# Patient Record
Sex: FEMALE | Race: WHITE
[De-identification: names, ages, dates, MRNs, and addresses within clinical notes are randomized per-mention and may not be internally consistent; named-entity substitution may affect disease eponyms.]

---

## 2018-10-11 ENCOUNTER — HOSPITAL ENCOUNTER (INPATIENT)
Dept: HOSPITAL 36 - GERO | Age: 68
LOS: 11 days | Discharge: NURSING, REHAB OR LONG-TERM CARE | DRG: 885 | End: 2018-10-22
Attending: PSYCHIATRY & NEUROLOGY | Admitting: PSYCHIATRY & NEUROLOGY
Payer: MEDICARE

## 2018-10-11 VITALS — SYSTOLIC BLOOD PRESSURE: 161 MMHG | DIASTOLIC BLOOD PRESSURE: 69 MMHG

## 2018-10-11 DIAGNOSIS — I10: ICD-10-CM

## 2018-10-11 DIAGNOSIS — M54.9: ICD-10-CM

## 2018-10-11 DIAGNOSIS — R27.0: ICD-10-CM

## 2018-10-11 DIAGNOSIS — G89.29: ICD-10-CM

## 2018-10-11 DIAGNOSIS — F20.0: Primary | ICD-10-CM

## 2018-10-11 DIAGNOSIS — F17.210: ICD-10-CM

## 2018-10-11 DIAGNOSIS — J44.9: ICD-10-CM

## 2018-10-11 DIAGNOSIS — M19.90: ICD-10-CM

## 2018-10-11 DIAGNOSIS — F03.90: ICD-10-CM

## 2018-10-11 DIAGNOSIS — F29: ICD-10-CM

## 2018-10-11 PROCEDURE — G0410 GRP PSYCH PARTIAL HOSP 45-50: HCPCS

## 2018-10-12 NOTE — CONSULTATION
DATE OF CONSULTATION:  



INTERNAL MEDICINE CONSULT



REFERRING MD:  Dr. Stewart.



REASON FOR CONSULT:  Medical management.



HISTORY OF PRESENT ILLNESS:  The patient is a 68-year-old  female, who

apparently was brought in to [facility] by the police

department for inability to meet basic needs.  The patient apparently has

undefined psych diagnosis.  Per records, she has a history of hypertension and

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

ambulation.  She also states that she has chronic lower back issues secondary to

multiple accidents.  She was transferred to this hospital for Geropsych

supportive care.  She is awake and was able to answer some simple questions, but

overall she is a poor historian and somewhat hard of hearing.



PAST MEDICAL HISTORY:  As noted above, history of hypertension.



PAST SURGICAL HISTORY:  States that she has had multiple surgeries including

.



FAMILY HISTORY:  Likely noncontributory to this admission.



SOCIAL HISTORY:  Alcohol denies.  Tobacco, she does smoke on a daily basis,

unable to quantify it.  No illicit drug usage reported.



ALLERGIES:  CODEINE.



OUTPATIENT MEDICATIONS:  None listed.



REVIEW OF SYSTEMS:  A good review of systems was not able to be done given

patient's condition.

CONSTITUTIONAL:  She denies any fever, chills, and recent weight loss.

CARDIOVASCULAR:  No chest pain, palpitations.

PULMONARY:  No cough, phlegm production.

GASTROINTESTINAL:  Denies any abdominal pain.

GENITOURINARY:  No bladder habit changes.

NEUROLOGIC:  Denies any syncope or any headaches.

MUSCULOSKELETAL:  Unable to walk without a walker secondary to instability and

back issues.



PHYSICAL EXAMINATION:

VITAL SIGNS:  Temperature 96.8, pulse 97, /69, respiratory rate 20, sats

92 on room air.

GENERAL:  She is a well-developed, thin female, not in acute distress, who

appears somewhat disheveled.  Oropharynx is moist and clear.

CARDIAC:  Regular rate with distant sounds.

LUNGS:  Diminished at the bases.  Clear to auscultation.

ABDOMEN:  Soft, supple, nontender, nondistended, normoactive bowel sounds.

EXTREMITIES:  Lower extremities, there is no pedal edema.

NEUROLOGIC:  She appears to be nonfocal.  Cranial nerves 2-12 are within normal

limits.



LABORATORY DATA:  Done on the  show a white count of 9.9, H and H of 14/44

with a platelet count of 343.



IMPRESSION:

1.  Grave disability with danger to self.

2.  Psychiatric disorder - nonspecific.

3.  History of essential hypertension.

4.  Ataxia.

5.  History of lower back pain.



PLAN:  The patient has been admitted to the Geropsych fuentes where she will be

monitored for her disability.  I will start the patient on clonidine p.r.n. for

SBP greater than 160 and will start her on lisinopril 10 mg q. day.  We will

continue to monitor BP and we will adjust meds as needed.  I will also ask for a

PT eval given her ataxia.





DD: 10/12/2018 10:52

DT: 10/12/2018 22:54

JOB# [id number]  [id number]

## 2018-10-12 NOTE — HISTORY & PHYSICAL
ADMIT DATE:  10/11/2018



HISTORY OF PRESENT ILLNESS:  The patient is a 68-year-old female with long

history of hypertension, COPD, dementia, psychosis, admitted to [facility] under Dr. Stewart's service.  The patient denies any chest

pain, shortness of breath, nausea, vomiting, fever or chills.



PAST MEDICAL HISTORY:  Significant for hypertension, COPD, degenerative joint

disease, dementia.



PAST SURGICAL HISTORY:  No recent surgery.



ALLERGIES:  None.



MEDICATIONS:  Follow admission reconciliation.



SOCIAL HISTORY:  Chronic smoker.  No alcohol or drugs.



FAMILY HISTORY:  Noncontributory.



REVIEW OF SYSTEMS:

RENAL SYSTEM:  No history of chronic renal disorder.

CARDIOVASCULAR SYSTEM:  No coronary artery disease.  She has history of

hypertension.

ENDOCRINE SYSTEM:  No diabetes or thyroid problem.

GASTROINTESTINAL SYSTEM:  No upper or lower GI bleed.

NEUROLOGICAL SYSTEM:  No seizure disorder.

MUSCULOSKELETAL:  No muscular dystrophy.

HEMATOLOGIC SYSTEM:  No bleeding tendencies.

RESPIRATORY SYSTEM:  She has history of COPD.

GENITOURINARY SYSTEM:  No dysuria or hematuria.



PHYSICAL EXAMINATION:

GENERAL:  She is awake, alert, confused.

VITAL SIGNS:  Temperature 97.4, heart rate 87, blood pressure 119/77.

HEENT:  Normocephalic.  Pupils reactive to light and accommodation.  Sclerae

clear.

NECK:  Supple.  Negative for lymphadenopathy, JVD or bruit.

CHEST:  Entry of air bilaterally diminished throughout.

HEART:  S1, S2 normal.

ABDOMEN:  Soft, bowel sounds positive.

EXTREMITIES:  No edema.

NEUROLOGICAL:  She is awake, alert, not coherent.  She has no focal motor or

sensory deficits.  Cranial nerves 2-12 are intact.



ASSESSMENT:

1.  Hypertension.

2.  Chronic obstructive pulmonary disease.

3.  Degenerative joint disease.

4.  Dementia.

5.  Psychosis.



PLAN:  The patient in the hospital under Dr. Stewart's service.  Medical

problems addressed during hospitalization  psychosis.  Medical problems

addressed at discharge COPD and hypertension.  The patient is medically stable

for activity.



Thank you Dr. Stewart for asking me to see your patient.





DD: 10/12/2018 21:03

DT: 10/12/2018 22:33

JOB# [id number]  [id number]

## 2018-10-13 NOTE — GENERAL PROGRESS NOTE
Subjective





- Review of Systems


Service Date: 10/13/18


Subjective: 





sitting comfortably in chair


confused


no distress





Objective





- Physical Exam


Vitals and I&O: 


 Vital Signs











Temp  98.4 F   10/13/18 06:05


 


Pulse  84   10/13/18 06:05


 


Resp  18   10/13/18 06:05


 


BP  141/79   10/13/18 06:05


 


Pulse Ox  93   10/13/18 06:05








 Intake & Output











 10/12/18 10/13/18 10/13/18





 18:59 06:59 18:59


 


Intake Total 960  


 


Balance 960  


 


Intake:   


 


  Oral 960  


 


Other:   


 


  # Voids 3  


 


  # Bowel Movements 1  











Active Medications: 


Current Medications





Acetaminophen (Tylenol)  650 mg PO Q4HR PRN


   PRN Reason: Mild Pain / Temp above 100


   Stop: 12/10/18 21:42


Al Hydrox/Mg Hydrox/Simethicone (Maalox)  30 ml PO Q4HR PRN


   PRN Reason: GI DISTRESS


   Stop: 12/10/18 21:42


Donepezil HCl (Aricept)  5 mg PO DAILY JAZ


   Stop: 18 08:59


Lisinopril (Zestril)  10 mg PO DAILY JAZ


   Stop: 18 10:59


   Last Admin: 10/12/18 13:41 Dose:  Not Given


Lorazepam (Ativan)  0.5 mg PO Q4H PRN; Protocol


   PRN Reason: Anxiety


   Stop: 12/10/18 21:42


Magnesium Hydroxide (Milk Of Magnesia)  30 ml PO HS PRN


   PRN Reason: Constipation


Memantine (Namenda)  5 mg PO BID JAZ


   Stop: 18 16:59


Multivitamins/Vitamin C (Theragran)  1 tab PO DAILY JAZ


   Stop: 18 08:59


   Last Admin: 10/12/18 13:41 Dose:  Not Given


Olanzapine (Zyprexa)  5 mg PO HS JAZ; Protocol


   Stop: 18 20:59


Zolpidem Tartrate (Ambien)  5 mg PO HS PRN


   PRN Reason: Insomnia


   Stop: 12/10/18 21:42








General: No acute distress


HEENT: Atraumatic, PERRLA


Neck: Supple, JVD


Cardiovascular: Regular rate, Normal S1, Normal S2


Lungs: Clear to auscultation


Abdomen: Bowel sounds, Soft





Assessment/Plan





- Assessment


Assessment: 





dementia


HTN


COPD


DJD








- Plan


Plan: 





continue current treatment





Nutritional Asmnt/Malnutr-PDOC





- Dietary Evaluation


Malnutrition Findings (Please click <Entered> for more info): 








Nutritional Asmnt/Malnutrition                             Start:  10/12/18 14:

37


Text:                                                      Status: Complete    

  


Freq:                                                                          

  


Protocol:                                                                      

  


 Document     10/12/18 14:38  BISMARK  (Rec: 10/12/18 14:49  [email or web address]  [email or web address])


 Nutritional Asmnt/Malnutrition


     Patient General Information


      Nutritional Screening                      High Risk


      Diagnosis                                  psychosis


      Pertinent Medical Hx/Surgical Hx           No H&P available as of now.


                                                 Per nursing note, pt has


                                                 history of bipolar disorder,


                                                 ETOH abuse, smoker, HTN


      Subjective Information                     Pt standing in hallway at time


                                                 of visit. Pt was alert and


                                                 oriented; states she dislikes


                                                 carbonated or diet beverages.


                                                 No PO intake noted at this


                                                 time; pt states she only ate a


                                                 small amount of breakfast


                                                 today. BMI 15.4 noted.


      Current Diet Order/ Nutrition Support      regular


      Pertinent Medications                      maalox, MOM, theragran


      Pertinent Labs                             no current lab results


     Nutritional Hx/Data


      Height                                     1.55 m


      Height (Calculated Centimeters)            154.9


      Current Weight (lbs)                       36.922 kg


      Weight (Calculated Kilograms)              36.9


      Weight (Calculated Grams)                  95619.4


      Ideal Body Weight                          105 lb


      Body Mass Index (BMI)                      15.3


      Weight Status                              Underweight


     GI Symptoms


      GI Symptoms                                None


      Last BM                                    none noted


      Difficult in:                              None


      Food Allergies                             No


      Skin Integrity/Comment:                    kehinde, agnes 20


     Estimated Nutritional Goals


      BEE in Kcals:                              Using Current wt


      Calories/Kcals/Kg                          30-35


      Kcals Calculated                           3909-9410


      Protein:                                   Using Current wt


      Protein g/k.2-1.4


      Protein Calculated                         44-52g


      Fluid: ml                                  0508-8732 (1 ml/kcal)


     Nutritional Problem


      1. Problem


       Problem                                   Underweight


       Etiology                                  possible poor PO intake


       Signs/Symptoms:                           BMI 15.4


     Intervention/Recommendation


      Comments                                   1. Continue with regular diet


                                                 as ordered.


                                                 2. If PO intake <75%, consider


                                                 adding nutrition supplement


                                                 for added kcals


                                                 2. Monitor PO intake, wt, labs


                                                 and skin integrity


                                                 3. F/U as moderate risk in 3-5


                                                 days, 10/15-10/17; PO check


                                                 10/15


     Expected Outcomes/Goals


      Expected Outcomes/Goals                    1. PO intake to meet at least


                                                 75% of nutritional needs


                                                 2. Wt gain or stability, skin


                                                 to remain intact, and


                                                 nutrition related labs to


                                                 approach normal limits


                                                 [place] by Mariluz Gomez RD

## 2018-10-13 NOTE — PROGRESS NOTES
DATE:  10/13/2018



SUBJECTIVE:  Staff was spoken to.  The patient is interviewed.  Mood is noted to

be irritable.  Affect is constricted.  The patient is paranoid and is going on a

tangent.  The patient's insight and judgment at this time are noted to be still

impaired.  Impulse control is noted to be poor.  Coping skills are also noted to

be very poor.  The patient has been having difficult time to cope with the

stress.  The patient is pacing most of the time on the unit.  When asked what

happened for her to be brought to [facility], she is stating that

there has been a Halloween party that is going on and that is the reason why she

has been brought over there.



ASSESSMENT:  The patient is grossly psychotic and impulsive and is not ready to

be discharged to a lower level of care.



PLAN:  To start the Aricept and Namenda along with the Zyprexa and follow the

patient with the supportive therapy.  Please note that the patient is not ready

to be discharged to a lower level of care yet.





DD: 10/13/2018 09:32

DT: 10/13/2018 21:39

JOB# [id number]  [id number]

## 2018-10-13 NOTE — PSYCHIATRIC EVALUATION
DATE OF SERVICE:  10/11/2018



IDENTIFYING DATA:  The patient is a 68-year-old  woman admitted from

[facility] in view of her psychosis.



CHIEF COMPLAINT:  "I cannot talk much about it now, I am tired."



HISTORY OF PRESENT ILLNESS:  This is the first psychiatric hospitalization to

[facility] for this 68-year-old woman, who is reported to have

been diagnosed to have schizophrenia, chronic paranoid type.  The patient is

reported to be very resistive to care and has been brought by the paramedics. 

The patient is reported to have been refused to get into her room and has been

wandering on the unit.  The patient is extremely paranoid at this time.  The

patient's coping skills at this time are noted to be poor.  Sleep and appetite

are also noted to be very poor.  Review of the chart indicated that the patient

has been on Zyprexa, complains of the pain medication is noted to be

questionable.



PAST PSYCHIATRIC HISTORY:  Details are not known.



MEDICAL HISTORY:  Physical examination is requested to be done by Dr. Patterson.



SUBSTANCE ABUSE HISTORY:  None.



PHYSICAL OR SEXUAL ABUSE HISTORY:  None.



LEGAL PROBLEMS:  None at this time.



STRENGTH AND ASSETS:  The patient is motivated.



MENTAL STATUS EXAMINATION:  The patient is a 68-year-old woman looking her

stated age, superficially cooperative.  Eye contact is poor.  Mood is noted to

be irritable.  Affect is constricted.  Insight and judgment at this time are

noted to be very much impaired.  Impulse control seems to be poor.  Coping

skills are also noted to be very poor.  The patient has been having difficult

time to cope with the stress.  The patient is paranoid and then pacing most of

the time.  The patient is denying any command hallucinations.  The patient's

behavior is a danger to self at this time and the patient is gravely disabled.



DIAGNOSTIC IMPRESSION:

AXIS I:  Schizophrenia, chronic paranoid type.

AXIS II:  None.

AXIS III:  As per Dr. Patterson.



IMMEDIATE TREATMENT PLAN:  The patient is going to be observed on the inpatient

unit, provided with supportive psychotherapy.  Once stabilized, the patient is

going to be discharged to [facility] to be followed up on an outpatient basis.





DD: 10/12/2018 18:31

DT: 10/13/2018 06:18

JOB# [id number]  [id number]

## 2018-10-14 NOTE — CONSULTATION
DATE OF CONSULTATION:     10/13/2018



REFERRING PHYSICIAN:  Coty Stewart M.D.



TYPE OF CONSULTATION:  Psychology.



HISTORY OF PRESENT ILLNESS:  The patient is a 68-year-old  female.  The

patient is being admitted from [facility] due to

psychosis.  Following is by record review and by patient's self report. 

According to record review, the patient has a history of schizophrenia, chronic

paranoid type.  The records include reports indicating that the patient had been

resistive to care and was brought here by paramedics. Upon Interview, the

patient has been pacing on the unit and it is difficult to redirect.  The 
patient

is perseverating on asking about what is scheduled for today.  The patient did 
not

answer questions about suicidal ideation, plan or intention and is highly

distractible and poorly redirectable.



PAST MEDICAL HISTORY:  Please see history and physical by Dr. Patterson.



PAST PSYCHIATRIC HISTORY:  According to the admitting records, the patient has a

history of schizophrenia.  However, further details are unknown.  It is unknown

whether the patient is under the care of a psychiatrist or psychologist.  It is

unknown where the patient is currently residing.



SUBSTANCE ABUSE HISTORY:  The patient did not answer these questions.



PSYCHOSOCIAL HISTORY:  The patient did not answer these questions.  The patient

did not respond to occupational or educational history or [employer]ae[employer] affiliation

questions.  The patient did not answer the question about history and physical

or sexual abuse or current legal problems.  The patient did not answer the

questions about current placement or past residence.  The patient did not answer

questions about support system including family members or family relationships.



MENTAL STATUS EXAMINATION:  The patient appears to be older than her stated age.

The patient's attitude is guarded and suspicious.  Mood is irritable.  Affect

is constricted.  Thought process includes perseveration on current activities on

the unit.  The patient is rigid in her cognitive process.  The patient denied 
any

suicidal ideation, plan, or intention.  There is evidence of paranoid ideation. 

The patient denied any hallucinations or delusions.  Impulse control is

inadequate.  Concentration is poor.  Sensorium is alert and oriented to self 
and place

only.  The patient did not participate in the memory assessment.  The patient is

highly distractible.  The patient did not participate in the interpretation of

proverbs.  Insight is impaired.  Judgment is impaired.



DIAGNOSTIC IMPRESSION:

AXIS I:  Schizophrenia, chronic paranoid type.

AXIS II:  Deferred.

AXIS III:  Per Dr. Patterson.



TREATMENT PLAN:  The patient has been seen by Dr. Stewart for psychiatric

evaluation and for the management of the patient's psychotropic medications.  We

will provide supportive psychotherapy to include reality orientation,

differentiation, and integration.  We will provide motivational enhancement as

well as positive reinforcement for the patient to become compliant and stay

compliant with all aspects of her care and treatment.  We will continue to

provide opportunities for the patient to verbally contract for safety.  We will

encourage the patient to be able to demonstrate emotional and self-regulation

prior to her discharge.  We will provide coping strategies for phase of life

issues as well as for chronic long-term severe mental illness.  

is aware of possible placement issues.



Thank you, Dr. Stewart for this consult and the opportunity to participate in

this patient's care.





DD: 10/13/2018 14:13

DT: 10/14/2018 02:15

JOB# [id number]  [id number]

[email or web address]

## 2018-10-14 NOTE — PROGRESS NOTES
DATE:  10/14/2018



PSYCHIATRIC PROGRESS NOTE



PROGRESS ON THE UNIT:  Staff was spoken to.  The patient is interviewed.  Mood

is noted to be irritable.  Affect is constricted.  The patient is going on a

tangent.  The patient is stating that she should not be on any medications.  The

patient has been placed on 5 mg of olanzapine.  The patient is pacing on the

unit.  The patient's short-term and long-term memory are noted to be impaired.



ASSESSMENT:  The patient is still psychotic.



PLAN:  To continue the patient with current medications.  I encouraged the

patient to verbalize the concerns rather than to act out.





DD: 10/14/2018 10:31

DT: 10/14/2018 11:27

JOB# [id number]  [id number]

## 2018-10-14 NOTE — GENERAL PROGRESS NOTE
Subjective





- Review of Systems


Service Date: 10/14/18


Subjective: 





sitting comfortably in chair


confused


no distress





Objective





- Physical Exam


Vitals and I&O: 


 Vital Signs











Temp  97.8 F   10/14/18 14:00


 


Pulse  91   10/14/18 14:00


 


Resp  20   10/14/18 14:00


 


BP  119/87   10/14/18 14:00


 


Pulse Ox  97   10/14/18 14:00








 Intake & Output











 10/13/18 10/14/18 10/14/18





 18:59 06:59 18:59


 


Intake Total 950  950


 


Balance 950  950


 


Intake:   


 


  Oral 950  950


 


Other:   


 


  # Voids 4 3 3


 


  # Bowel Movements 0 0 0











Active Medications: 


Current Medications





Acetaminophen (Tylenol)  650 mg PO Q4HR PRN


   PRN Reason: Mild Pain / Temp above 100


   Stop: 12/10/18 21:42


Al Hydrox/Mg Hydrox/Simethicone (Maalox)  30 ml PO Q4HR PRN


   PRN Reason: GI DISTRESS


   Stop: 12/10/18 21:42


Donepezil HCl (Aricept)  5 mg PO DAILY [facility]


   Stop: 18 08:59


   Last Admin: 10/14/18 09:16 Dose:  Not Given


Lisinopril (Zestril)  10 mg PO DAILY JAZ


   Stop: 18 10:59


   Last Admin: 10/14/18 09:16 Dose:  Not Given


Lorazepam (Ativan)  0.5 mg PO Q4H PRN; Protocol


   PRN Reason: Anxiety


   Stop: 12/10/18 21:42


Magnesium Hydroxide (Milk Of Magnesia)  30 ml PO HS PRN


   PRN Reason: Constipation


Memantine (Namenda)  5 mg PO BID JAZ


   Stop: 18 16:59


   Last Admin: 10/14/18 16:39 Dose:  Not Given


Multivitamins/Vitamin C (Theragran)  1 tab PO DAILY JAZ


   Stop: 18 08:59


   Last Admin: 10/14/18 09:16 Dose:  Not Given


Olanzapine (Zyprexa)  5 mg PO HS JAZ; Protocol


   Stop: 18 20:59


   Last Admin: 10/13/18 21:06 Dose:  Not Given


Zolpidem Tartrate (Ambien)  5 mg PO HS PRN


   PRN Reason: Insomnia


   Stop: 12/10/18 21:42








General: No acute distress


HEENT: Atraumatic, PERRLA


Neck: Supple, JVD


Cardiovascular: Regular rate, Normal S1, Normal S2


Lungs: Clear to auscultation


Abdomen: Bowel sounds, Soft





Assessment/Plan





- Assessment


Assessment: 





dementia


HTN


COPD


DJD








- Plan


Plan: 





continue current treatment





Nutritional Asmnt/Malnutr-PDOC





- Dietary Evaluation


Malnutrition Findings (Please click <Entered> for more info): 








Nutritional Asmnt/Malnutrition                             Start:  10/12/18 14:

37


Text:                                                      Status: Complete    

  


Freq:                                                                          

  


Protocol:                                                                      

  


 Document     10/12/18 14:38  BISMARK  (Rec: 10/12/18 14:49  [email or web address]  [email or web address])


 Nutritional Asmnt/Malnutrition


     Patient General Information


      Nutritional Screening                      High Risk


      Diagnosis                                  psychosis


      Pertinent Medical Hx/Surgical Hx           No H&P available as of now.


                                                 Per nursing note, pt has


                                                 history of bipolar disorder,


                                                 ETOH abuse, smoker, HTN


      Subjective Information                     Pt standing in hallway at time


                                                 of visit. Pt was alert and


                                                 oriented; states she dislikes


                                                 carbonated or diet beverages.


                                                 No PO intake noted at this


                                                 time; pt states she only ate a


                                                 small amount of breakfast


                                                 today. BMI 15.4 noted.


      Current Diet Order/ Nutrition Support      regular


      Pertinent Medications                      maalox, MOM, theragran


      Pertinent Labs                             no current lab results


     Nutritional Hx/Data


      Height                                     1.55 m


      Height (Calculated Centimeters)            154.9


      Current Weight (lbs)                       36.922 kg


      Weight (Calculated Kilograms)              36.9


      Weight (Calculated Grams)                  00286.4


      Ideal Body Weight                          105 lb


      Body Mass Index (BMI)                      15.3


      Weight Status                              Underweight


     GI Symptoms


      GI Symptoms                                None


      Last BM                                    none noted


      Difficult in:                              None


      Food Allergies                             No


      Skin Integrity/Comment:                    agnes busby


     Estimated Nutritional Goals


      BEE in Kcals:                              Using Current wt


      Calories/Kcals/Kg                          30-35


      Kcals Calculated                           6964-9143


      Protein:                                   Using Current wt


      Protein g/k.2-1.4


      Protein Calculated                         44-52g


      Fluid: ml                                  8490-8846 (1 ml/kcal)


     Nutritional Problem


      1. Problem


       Problem                                   Underweight


       Etiology                                  possible poor PO intake


       Signs/Symptoms:                           BMI 15.4


     Intervention/Recommendation


      Comments                                   1. Continue with regular diet


                                                 as ordered.


                                                 2. If PO intake <75%, consider


                                                 adding nutrition supplement


                                                 for added kcals


                                                 2. Monitor PO intake, wt, labs


                                                 and skin integrity


                                                 3. F/U as moderate risk in 3-5


                                                 days, 10/15-10/17; PO check


                                                 10/15


     Expected Outcomes/Goals


      Expected Outcomes/Goals                    1. PO intake to meet at least


                                                 75% of nutritional needs


                                                 2. Wt gain or stability, skin


                                                 to remain intact, and


                                                 nutrition related labs to


                                                 approach normal limits


                                                 Reiewed by Mariluz Gomez RD

## 2018-10-15 NOTE — PROGRESS NOTES
DATE:  10/15/2018



SUBJECTIVE:  Staff was spoken to.  The patient is interviewed.  Mood is noted to

be irritable.  Affect is constricted.  The patient has been displaying

confabulation.  The patient has not been able to provide detailed information

where she is going to be staying if she were going to be discharged.  When asked

about any family members, the patient is reporting that she has many family

members, but she cannot get their names and the patient has been demented and

paranoid and gravely disabled.



PLAN:  To continue the patient on the current medications and followup.





DD: 10/15/2018 18:32

DT: 10/15/2018 23:35

JOB# [id number]  [id number]

## 2018-10-15 NOTE — INTERNAL MEDICINE PROG NOTE
Internal Medicine Subjective





- Subjective


Service Date: 10/15/18


Patient seen and examined:: with staff


Patient is:: awake, verbal, in bed, talking


Per staff patient has:: no adverse event





Internal Medicine Objective





- Physical Exam


Vitals and I&O: 


 Vital Signs











Temp  98.2 F   10/15/18 14:00


 


Pulse  71   10/15/18 14:00


 


Resp  20   10/15/18 14:00


 


BP  159/71   10/15/18 14:00


 


Pulse Ox  97   10/15/18 14:00








 Intake & Output











 10/15/18 10/15/18 10/16/18





 06:59 18:59 06:59


 


Intake Total 240 800 


 


Balance 240 800 


 


Intake:   


 


  Oral 240 800 


 


Other:   


 


  # Voids 1 3 


 


  # Bowel Movements  1 











Active Medications: 


Current Medications





Acetaminophen (Tylenol)  650 mg PO Q4HR PRN


   PRN Reason: Mild Pain / Temp above 100


   Stop: 12/10/18 21:42


Al Hydrox/Mg Hydrox/Simethicone (Maalox)  30 ml PO Q4HR PRN


   PRN Reason: GI DISTRESS


   Stop: 12/10/18 21:42


Donepezil HCl (Aricept)  5 mg PO DAILY [facility]


   Stop: 18 08:59


   Last Admin: 10/15/18 09:32 Dose:  Not Given


Lisinopril (Zestril)  10 mg PO DAILY JAZ


   Stop: 18 10:59


   Last Admin: 10/15/18 09:32 Dose:  Not Given


Lorazepam (Ativan)  0.5 mg PO Q4H PRN; Protocol


   PRN Reason: Anxiety


   Stop: 12/10/18 21:42


Magnesium Hydroxide (Milk Of Magnesia)  30 ml PO HS PRN


   PRN Reason: Constipation


Memantine (Namenda)  5 mg PO BID JAZ


   Stop: 18 16:59


   Last Admin: 10/15/18 16:24 Dose:  Not Given


Multivitamins/Vitamin C (Theragran)  1 tab PO DAILY [facility]


   Stop: 18 08:59


   Last Admin: 10/15/18 09:33 Dose:  Not Given


Olanzapine (Zyprexa)  5 mg PO HS JAZ; Protocol


   Stop: 18 20:59


   Last Admin: 10/14/18 20:57 Dose:  Not Given


Zolpidem Tartrate (Ambien)  5 mg PO HS PRN


   PRN Reason: Insomnia


   Stop: 12/10/18 21:42








General: demented


HEENT: NC/AT, PERRLA, EOMI, anicteric sclerae, throat clear


Neck: Supple, No JVD, No thyromegaly, +2 carotid pulse wo bruit, No LAD


Lungs: CTAB


Cardiovascular: RRR, Normal S1, Normal S2, without murmur


Abdomen: soft, non-tender, non-distended


Extremities: clear


Neurological: no change





Internal Medicine Assmt/Plan





- Assessment


Assessment: 





1.HTN.


2.COPD.


3.DJD.


4.DEMENTIA


5.PSYCHOSIS.





- Plan


Plan: 





CONTINUE ON CURRENT MEDICATION AND DIET.





Nutritional Asmnt/Malnutr-PDOC





- Dietary Evaluation


Malnutrition Findings (Please click <Entered> for more info): 








Nutritional Asmnt/Malnutrition                             Start:  10/12/18 14:

37


Text:                                                      Status: Complete    

  


Freq:                                                                          

  


Protocol:                                                                      

  


 Document     10/12/18 14:38  BISMARK  (Rec: 10/12/18 14:49  BISMARK  [email or web address])


 Nutritional Asmnt/Malnutrition


     Patient General Information


      Nutritional Screening                      High Risk


      Diagnosis                                  psychosis


      Pertinent Medical Hx/Surgical Hx           No H&P available as of now.


                                                 Per nursing note, pt has


                                                 history of bipolar disorder,


                                                 ETOH abuse, smoker, HTN


      Subjective Information                     Pt standing in hallway at time


                                                 of visit. Pt was alert and


                                                 oriented; states she dislikes


                                                 carbonated or diet beverages.


                                                 No PO intake noted at this


                                                 time; pt states she only ate a


                                                 small amount of breakfast


                                                 today. BMI 15.4 noted.


      Current Diet Order/ Nutrition Support      regular


      Pertinent Medications                      maalox, MOM, theragran


      Pertinent Labs                             no current lab results


     Nutritional Hx/Data


      Height                                     1.55 m


      Height (Calculated Centimeters)            154.9


      Current Weight (lbs)                       36.922 kg


      Weight (Calculated Kilograms)              36.9


      Weight (Calculated Grams)                  45719.4


      Ideal Body Weight                          105 lb


      Body Mass Index (BMI)                      15.3


      Weight Status                              Underweight


     GI Symptoms


      GI Symptoms                                None


      Last BM                                    none noted


      Difficult in:                              None


      Food Allergies                             No


      Skin Integrity/Comment:                    agnes busby 20


     Estimated Nutritional Goals


      BEE in Kcals:                              Using Current wt


      Calories/Kcals/Kg                          30-35


      Kcals Calculated                           0270-7005


      Protein:                                   Using Current wt


      Protein g/k.2-1.4


      Protein Calculated                         44-52g


      Fluid: ml                                  0603-4279 (1 ml/kcal)


     Nutritional Problem


      1. Problem


       Problem                                   Underweight


       Etiology                                  possible poor PO intake


       Signs/Symptoms:                           BMI 15.4


     Intervention/Recommendation


      Comments                                   1. Continue with regular diet


                                                 as ordered.


                                                 2. If PO intake <75%, consider


                                                 adding nutrition supplement


                                                 for added kcals


                                                 2. Monitor PO intake, wt, labs


                                                 and skin integrity


                                                 3. F/U as moderate risk in 3-5


                                                 days, 10/15-10/17; PO check


                                                 10/15


     Expected Outcomes/Goals


      Expected Outcomes/Goals                    1. PO intake to meet at least


                                                 75% of nutritional needs


                                                 2. Wt gain or stability, skin


                                                 to remain intact, and


                                                 nutrition related labs to


                                                 approach normal limits


                                                 Reiewed by Mariluz Gomez RD

## 2018-10-16 NOTE — INTERNAL MEDICINE PROG NOTE
Internal Medicine Subjective





- Subjective


Service Date: 10/16/18


Patient seen and examined:: with staff


Patient is:: awake, verbal, in bed, talking


Per staff patient has:: no adverse event





Internal Medicine Objective





- Physical Exam


Vitals and I&O: 


 Vital Signs











Temp  0 F   10/16/18 20:23


 


Pulse  70   10/16/18 14:00


 


Resp  18   10/16/18 14:00


 


BP  135/86   10/16/18 14:00


 


Pulse Ox  97   10/16/18 14:00








 Intake & Output











 10/16/18 10/16/18 10/17/18





 06:59 18:59 06:59


 


Intake Total 180 800 120


 


Balance 180 800 120


 


Intake:   


 


  Oral 180 800 120


 


Other:   


 


  # Voids 3 3 3


 


  # Bowel Movements 0 1 0











Active Medications: 


Current Medications





Acetaminophen (Tylenol)  650 mg PO Q4HR PRN


   PRN Reason: Mild Pain / Temp above 100


   Stop: 12/10/18 21:42


Al Hydrox/Mg Hydrox/Simethicone (Maalox)  30 ml PO Q4HR PRN


   PRN Reason: GI DISTRESS


   Stop: 12/10/18 21:42


Donepezil HCl (Aricept)  5 mg PO DAILY [facility]


   Stop: 18 08:59


   Last Admin: 10/16/18 09:16 Dose:  Not Given


Lisinopril (Zestril)  10 mg PO DAILY [facility]


   Stop: 18 10:59


   Last Admin: 10/16/18 09:16 Dose:  Not Given


Lorazepam (Ativan)  0.5 mg PO Q4H PRN; Protocol


   PRN Reason: Anxiety


   Stop: 12/10/18 21:42


Magnesium Hydroxide (Milk Of Magnesia)  30 ml PO HS PRN


   PRN Reason: Constipation


Memantine (Namenda)  5 mg PO BID J[facility]


   Stop: 18 16:59


   Last Admin: 10/16/18 17:18 Dose:  Not Given


Multivitamins/Vitamin C (Theragran)  1 tab PO DAILY [facility]


   Stop: 18 08:59


   Last Admin: 10/16/18 09:17 Dose:  Not Given


Olanzapine (Zyprexa)  5 mg PO HS JAZ; Protocol


   Stop: 18 20:59


   Last Admin: 10/16/18 20:55 Dose:  Not Given


Zolpidem Tartrate (Ambien)  5 mg PO HS PRN


   PRN Reason: Insomnia


   Stop: 12/10/18 21:42








General: demented


HEENT: NC/AT, PERRLA, EOMI, anicteric sclerae, throat clear


Neck: Supple, No JVD, No thyromegaly, +2 carotid pulse wo bruit, No LAD


Lungs: CTAB


Cardiovascular: RRR, Normal S1, Normal S2, without murmur


Abdomen: soft, non-tender, non-distended


Extremities: clear


Neurological: no change





Internal Medicine Assmt/Plan





- Assessment


Assessment: 





1.HTN.


2.COPD.


3.DJD.


4.DEMENTIA


5.PSYCHOSIS.





- Plan


Plan: 





CONTINUE ON CURRENT MEDICATION AND DIET.





Nutritional Asmnt/Malnutr-PDOC





- Dietary Evaluation


Malnutrition Findings (Please click <Entered> for more info): 








Nutritional Asmnt/Malnutrition                             Start:  10/12/18 14:

37


Text:                                                      Status: Complete    

  


Freq:                                                                          

  


Protocol:                                                                      

  


 Document     10/12/18 14:38  BISMARK  (Rec: 10/12/18 14:49  BISMARK  [email or web address])


 Nutritional Asmnt/Malnutrition


     Patient General Information


      Nutritional Screening                      High Risk


      Diagnosis                                  psychosis


      Pertinent Medical Hx/Surgical Hx           No H&P available as of now.


                                                 Per nursing note, pt has


                                                 history of bipolar disorder,


                                                 ETOH abuse, smoker, HTN


      Subjective Information                     Pt standing in hallway at time


                                                 of visit. Pt was alert and


                                                 oriented; states she dislikes


                                                 carbonated or diet beverages.


                                                 No PO intake noted at this


                                                 time; pt states she only ate a


                                                 small amount of breakfast


                                                 today. BMI 15.4 noted.


      Current Diet Order/ Nutrition Support      regular


      Pertinent Medications                      maalox, MOM, theragran


      Pertinent Labs                             no current lab results


     Nutritional Hx/Data


      Height                                     1.55 m


      Height (Calculated Centimeters)            154.9


      Current Weight (lbs)                       36.922 kg


      Weight (Calculated Kilograms)              36.9


      Weight (Calculated Grams)                  57521.4


      Ideal Body Weight                          105 lb


      Body Mass Index (BMI)                      15.3


      Weight Status                              Underweight


     GI Symptoms


      GI Symptoms                                None


      Last BM                                    none noted


      Difficult in:                              None


      Food Allergies                             No


      Skin Integrity/Comment:                    agnes busby 20


     Estimated Nutritional Goals


      BEE in Kcals:                              Using Current wt


      Calories/Kcals/Kg                          30-35


      Kcals Calculated                           1477-5377


      Protein:                                   Using Current wt


      Protein g/k.2-1.4


      Protein Calculated                         44-52g


      Fluid: ml                                  9626-2773 (1 ml/kcal)


     Nutritional Problem


      1. Problem


       Problem                                   Underweight


       Etiology                                  possible poor PO intake


       Signs/Symptoms:                           BMI 15.4


     Intervention/Recommendation


      Comments                                   1. Continue with regular diet


                                                 as ordered.


                                                 2. If PO intake <75%, consider


                                                 adding nutrition supplement


                                                 for added kcals


                                                 2. Monitor PO intake, wt, labs


                                                 and skin integrity


                                                 3. F/U as moderate risk in 3-5


                                                 days, 10/15-10/17; PO check


                                                 10/15


     Expected Outcomes/Goals


      Expected Outcomes/Goals                    1. PO intake to meet at least


                                                 75% of nutritional needs


                                                 2. Wt gain or stability, skin


                                                 to remain intact, and


                                                 nutrition related labs to


                                                 approach normal limits


                                                 Reiewed by Mariluz Gomez RD

## 2018-10-17 NOTE — INTERNAL MEDICINE PROG NOTE
Internal Medicine Subjective





- Subjective


Service Date: 10/17/18


Patient seen and examined:: with staff


Patient is:: awake, verbal, in bed, talking


Per staff patient has:: no adverse event





Internal Medicine Objective





- Physical Exam


Vitals and I&O: 


 Vital Signs











Temp  98.7 F   10/17/18 20:00


 


Pulse  93   10/17/18 20:00


 


Resp  19   10/17/18 20:00


 


BP  113/77   10/17/18 20:00


 


Pulse Ox  93   10/17/18 20:00








 Intake & Output











 10/17/18 10/17/18 10/18/18





 06:59 18:59 06:59


 


Intake Total 120 1500 


 


Balance 120 1500 


 


Intake:   


 


  Oral 120 1500 


 


Other:   


 


  # Voids 2 3 


 


  # Bowel Movements 0 0 











Active Medications: 


Current Medications





Acetaminophen (Tylenol)  650 mg PO Q4HR PRN


   PRN Reason: Mild Pain / Temp above 100


   Stop: 12/10/18 21:42


Al Hydrox/Mg Hydrox/Simethicone (Maalox)  30 ml PO Q4HR PRN


   PRN Reason: GI DISTRESS


   Stop: 12/10/18 21:42


Donepezil HCl (Aricept)  5 mg PO DAILY JAZ


   Stop: 18 08:59


   Last Admin: 10/17/18 09:20 Dose:  Not Given


Lisinopril (Zestril)  10 mg PO BID JAZ


   Stop: 18 09:59


   Last Admin: 10/17/18 10:00 Dose:  Not Given


Lorazepam (Ativan)  0.5 mg PO Q4H PRN; Protocol


   PRN Reason: Anxiety


   Stop: 12/10/18 21:42


Magnesium Hydroxide (Milk Of Magnesia)  30 ml PO HS PRN


   PRN Reason: Constipation


Memantine (Namenda)  5 mg PO BID JAZ


   Stop: 18 16:59


   Last Admin: 10/17/18 09:20 Dose:  Not Given


Multivitamins/Vitamin C (Theragran)  1 tab PO DAILY JAZ


   Stop: 18 08:59


   Last Admin: 10/17/18 09:20 Dose:  Not Given


Olanzapine (Zyprexa)  5 mg PO HS JAZ; Protocol


   Stop: 18 20:59


   Last Admin: 10/16/18 20:55 Dose:  Not Given


Zolpidem Tartrate (Ambien)  5 mg PO HS PRN


   PRN Reason: Insomnia


   Stop: 12/10/18 21:42








General: demented


HEENT: NC/AT, PERRLA, EOMI, anicteric sclerae, throat clear


Neck: Supple, No JVD, No thyromegaly, +2 carotid pulse wo bruit, No LAD


Lungs: CTAB


Cardiovascular: RRR, Normal S1, Normal S2, without murmur


Abdomen: soft, non-tender, non-distended


Extremities: clear


Neurological: no change





Internal Medicine Assmt/Plan





- Assessment


Assessment: 





1.HTN.


2.COPD.


3.DJD.


4.DEMENTIA


5.PSYCHOSIS.





- Plan


Plan: 





CONTINUE ON CURRENT MEDICATION AND DIET.





Nutritional Asmnt/Malnutr-PDOC





- Dietary Evaluation


Malnutrition Findings (Please click <Entered> for more info): 








Nutritional Asmnt/Malnutrition                             Start:  10/12/18 14:

37


Text:                                                      Status: Complete    

  


Freq:                                                                          

  


Protocol:                                                                      

  


 Document     10/12/18 14:38  BISMARK  (Rec: 10/12/18 14:49  [email or web address]  [email or web address])


 Nutritional Asmnt/Malnutrition


     Patient General Information


      Nutritional Screening                      High Risk


      Diagnosis                                  psychosis


      Pertinent Medical Hx/Surgical Hx           No H&P available as of now.


                                                 Per nursing note, pt has


                                                 history of bipolar disorder,


                                                 ETOH abuse, smoker, HTN


      Subjective Information                     Pt standing in hallway at time


                                                 of visit. Pt was alert and


                                                 oriented; states she dislikes


                                                 carbonated or diet beverages.


                                                 No PO intake noted at this


                                                 time; pt states she only ate a


                                                 small amount of breakfast


                                                 today. BMI 15.4 noted.


      Current Diet Order/ Nutrition Support      regular


      Pertinent Medications                      maalox, MOM, theragran


      Pertinent Labs                             no current lab results


     Nutritional Hx/Data


      Height                                     1.55 m


      Height (Calculated Centimeters)            154.9


      Current Weight (lbs)                       36.922 kg


      Weight (Calculated Kilograms)              36.9


      Weight (Calculated Grams)                  51924.4


      Ideal Body Weight                          105 lb


      Body Mass Index (BMI)                      15.3


      Weight Status                              Underweight


     GI Symptoms


      GI Symptoms                                None


      Last BM                                    none noted


      Difficult in:                              None


      Food Allergies                             No


      Skin Integrity/Comment:                    agnes busby 20


     Estimated Nutritional Goals


      BEE in Kcals:                              Using Current wt


      Calories/Kcals/Kg                          30-35


      Kcals Calculated                           9065-4859


      Protein:                                   Using Current wt


      Protein g/k.2-1.4


      Protein Calculated                         44-52g


      Fluid: ml                                  8926-1374 (1 ml/kcal)


     Nutritional Problem


      1. Problem


       Problem                                   Underweight


       Etiology                                  possible poor PO intake


       Signs/Symptoms:                           BMI 15.4


     Intervention/Recommendation


      Comments                                   1. Continue with regular diet


                                                 as ordered.


                                                 2. If PO intake <75%, consider


                                                 adding nutrition supplement


                                                 for added kcals


                                                 2. Monitor PO intake, wt, labs


                                                 and skin integrity


                                                 3. F/U as moderate risk in 3-5


                                                 days, 10/15-10/17; PO check


                                                 10/15


     Expected Outcomes/Goals


      Expected Outcomes/Goals                    1. PO intake to meet at least


                                                 75% of nutritional needs


                                                 2. Wt gain or stability, skin


                                                 to remain intact, and


                                                 nutrition related labs to


                                                 approach normal limits


                                                 Reiewed by Mariluz Gomez RD

## 2018-10-17 NOTE — PROGRESS NOTES
DATE:  10/16/2018



SUBJECTIVE:  Staff was spoken to.  The patient is interviewed.  Mood is noted to

be irritable.  Affect is constricted.  Insight and judgment are noted to be

still impaired.  Impulse control is noted to be limited.  Coping skills are

noted to be limited.  The patient has been having difficult time to cope with

the stress.



ASSESSMENT:  The patient is still paranoid and demented.



PLAN:  To continue the patient with the supportive therapy and work with the

 with regard to placement.





DD: 10/16/2018 19:02

DT: 10/17/2018 01:32

JOB# [id number]  [id number]

## 2018-10-18 NOTE — PROGRESS NOTES
DATE:  10/18/2018



SUBJECTIVE:  Staff was spoken to.  The patient is interviewed.  Mood is noted to

be irritable.  Affect is constricted.  The patient has no clue that she does not

have a place to return to, but the patient is still keep talking about that she

has a place to return and the patient is going on a tangent and has been very

confabulating.  The patient at this time is closely monitored for any of the

aggressive behavior and side effects to the medications and  is

working with the patient to look for placement.





DD: 10/18/2018 09:55

DT: 10/18/2018 13:24

JOB# [id number]  [id number]

## 2018-10-18 NOTE — INTERNAL MEDICINE PROG NOTE
Internal Medicine Subjective





- Subjective


Service Date: 10/18/18


Patient seen and examined:: with staff


Patient is:: awake, verbal, in bed, talking


Per staff patient has:: no adverse event





Internal Medicine Objective





- Physical Exam


Vitals and I&O: 


 Vital Signs











Temp  97.9 F   10/18/18 20:31


 


Pulse  73   10/18/18 20:31


 


Resp  20   10/18/18 20:31


 


BP  131/92   10/18/18 20:31


 


Pulse Ox  95   10/18/18 20:31








 Intake & Output











 10/18/18 10/18/18 10/19/18





 06:59 18:59 06:59


 


Intake Total 120 960 480


 


Balance 120 960 480


 


Intake:   


 


  Oral 120 960 480


 


Other:   


 


  # Voids 3 3 2


 


  # Bowel Movements  1 











Active Medications: 


Current Medications





Acetaminophen (Tylenol)  650 mg PO Q4HR PRN


   PRN Reason: Mild Pain / Temp above 100


   Stop: 12/10/18 21:42


Al Hydrox/Mg Hydrox/Simethicone (Maalox)  30 ml PO Q4HR PRN


   PRN Reason: GI DISTRESS


   Stop: 12/10/18 21:42


Donepezil HCl (Aricept)  5 mg PO DAILY JAZ


   Stop: 18 08:59


   Last Admin: 10/18/18 08:18 Dose:  Not Given


Lisinopril (Zestril)  10 mg PO BID JAZ


   Stop: 18 09:59


   Last Admin: 10/18/18 16:34 Dose:  Not Given


Lorazepam (Ativan)  0.5 mg PO Q4H PRN; Protocol


   PRN Reason: Anxiety


   Stop: 12/10/18 21:42


Magnesium Hydroxide (Milk Of Magnesia)  30 ml PO HS PRN


   PRN Reason: Constipation


Memantine (Namenda)  5 mg PO BID JAZ


   Stop: 18 16:59


   Last Admin: 10/18/18 16:35 Dose:  Not Given


Multivitamins/Vitamin C (Theragran)  1 tab PO DAILY JAZ


   Stop: 18 08:59


   Last Admin: 10/18/18 08:18 Dose:  Not Given


Olanzapine (Zyprexa)  5 mg PO HS JAZ; Protocol


   Stop: 18 20:59


   Last Admin: 10/18/18 20:52 Dose:  Not Given


Zolpidem Tartrate (Ambien)  5 mg PO HS PRN


   PRN Reason: Insomnia


   Stop: 12/10/18 21:42








General: demented


HEENT: NC/AT, PERRLA, EOMI, anicteric sclerae, throat clear


Neck: Supple, No JVD, No thyromegaly, +2 carotid pulse wo bruit, No LAD


Lungs: CTAB


Cardiovascular: RRR, Normal S1, Normal S2, without murmur


Abdomen: soft, non-tender, non-distended


Extremities: clear


Neurological: no change





Internal Medicine Assmt/Plan





- Assessment


Assessment: 





1.HTN.


2.COPD.


3.DJD.


4.DEMENTIA


5.PSYCHOSIS.





- Plan


Plan: 





CONTINUE ON CURRENT MEDICATION AND DIET.





Nutritional Asmnt/Malnutr-PDOC





- Dietary Evaluation


Malnutrition Findings (Please click <Entered> for more info): 








Nutritional Asmnt/Malnutrition                             Start:  10/12/18 14:

37


Text:                                                      Status: Complete    

  


Freq:                                                                          

  


Protocol:                                                                      

  


 Document     10/12/18 14:38  BISMARK  (Rec: 10/12/18 14:49  BISMARK  [email or web address])


 Nutritional Asmnt/Malnutrition


     Patient General Information


      Nutritional Screening                      High Risk


      Diagnosis                                  psychosis


      Pertinent Medical Hx/Surgical Hx           No H&P available as of now.


                                                 Per nursing note, pt has


                                                 history of bipolar disorder,


                                                 ETOH abuse, smoker, HTN


      Subjective Information                     Pt standing in hallway at time


                                                 of visit. Pt was alert and


                                                 oriented; states she dislikes


                                                 carbonated or diet beverages.


                                                 No PO intake noted at this


                                                 time; pt states she only ate a


                                                 small amount of breakfast


                                                 today. BMI 15.4 noted.


      Current Diet Order/ Nutrition Support      regular


      Pertinent Medications                      maalox, MOM, theragran


      Pertinent Labs                             no current lab results


     Nutritional Hx/Data


      Height                                     1.55 m


      Height (Calculated Centimeters)            154.9


      Current Weight (lbs)                       36.922 kg


      Weight (Calculated Kilograms)              36.9


      Weight (Calculated Grams)                  33640.4


      Ideal Body Weight                          105 lb


      Body Mass Index (BMI)                      15.3


      Weight Status                              Underweight


     GI Symptoms


      GI Symptoms                                None


      Last BM                                    none noted


      Difficult in:                              None


      Food Allergies                             No


      Skin Integrity/Comment:                    kehinde, agnes 20


     Estimated Nutritional Goals


      BEE in Kcals:                              Using Current wt


      Calories/Kcals/Kg                          30-35


      Kcals Calculated                           2274-9984


      Protein:                                   Using Current wt


      Protein g/k.2-1.4


      Protein Calculated                         44-52g


      Fluid: ml                                  9130-0753 (1 ml/kcal)


     Nutritional Problem


      1. Problem


       Problem                                   Underweight


       Etiology                                  possible poor PO intake


       Signs/Symptoms:                           BMI 15.4


     Intervention/Recommendation


      Comments                                   1. Continue with regular diet


                                                 as ordered.


                                                 2. If PO intake <75%, consider


                                                 adding nutrition supplement


                                                 for added kcals


                                                 2. Monitor PO intake, wt, labs


                                                 and skin integrity


                                                 3. F/U as moderate risk in 3-5


                                                 days, 10/15-10/17; PO check


                                                 10/15


     Expected Outcomes/Goals


      Expected Outcomes/Goals                    1. PO intake to meet at least


                                                 75% of nutritional needs


                                                 2. Wt gain or stability, skin


                                                 to remain intact, and


                                                 nutrition related labs to


                                                 approach normal limits


                                                 Reiewed by Mariluz Gomez RD

## 2018-10-18 NOTE — PROGRESS NOTES
DATE:  10/17/2018



PSYCHIATRIC PROGRESS NOTE



SUBJECTIVE:  Staff was spoken to.  The patient is interviewed.  Mood is noted to

be irritable.  Affect is constricted.  Insight and judgment are noted to be

still impaired.  Impulse control is noted to be limited.  Continues to be

paranoid.  The patient is going on a tangent.  The patient has no insight into

her illness.  The patient is stating that she had not been on any medications. 

The patient is currently on 5 mg of olanzapine and is able to tolerate

medication.



ASSESSMENT:  The patient is still demented and psychotic.



PLAN:  To continue the patient with supportive therapy and followup.





DD: 10/17/2018 18:51

DT: 10/18/2018 01:17

JOB# [id number]  [id number]

## 2018-10-19 NOTE — INTERNAL MEDICINE PROG NOTE
Internal Medicine Subjective





- Subjective


Service Date: 10/19/18


Patient seen and examined:: with staff


Patient is:: awake, verbal, in bed, talking


Per staff patient has:: no adverse event





Internal Medicine Objective





- Physical Exam


Vitals and I&O: 


 Vital Signs











Temp  98.3 F   10/19/18 20:47


 


Pulse  76   10/19/18 20:47


 


Resp  21   10/19/18 20:47


 


BP  142/80   10/19/18 20:47


 


Pulse Ox  96   10/19/18 20:47








 Intake & Output











 10/19/18 10/19/18 10/20/18





 06:59 18:59 06:59


 


Intake Total 480 1840 


 


Balance 480 1840 


 


Intake:   


 


  Oral 480 1840 


 


Other:   


 


  # Voids 2 3 


 


  # Bowel Movements  0 


 


  Stool Characteristics   Formed











Active Medications: 


Current Medications





Acetaminophen (Tylenol)  650 mg PO Q4HR PRN


   PRN Reason: Mild Pain / Temp above 100


   Stop: 12/10/18 21:42


Al Hydrox/Mg Hydrox/Simethicone (Maalox)  30 ml PO Q4HR PRN


   PRN Reason: GI DISTRESS


   Stop: 12/10/18 21:42


Donepezil HCl (Aricept)  5 mg PO DAILY JAZ


   Stop: 18 08:59


   Last Admin: 10/19/18 08:51 Dose:  Not Given


Lisinopril (Zestril)  10 mg PO BID JAZ


   Stop: 18 09:59


   Last Admin: 10/19/18 17:48 Dose:  Not Given


Lorazepam (Ativan)  0.5 mg PO Q4H PRN; Protocol


   PRN Reason: Anxiety


   Stop: 12/10/18 21:42


Magnesium Hydroxide (Milk Of Magnesia)  30 ml PO HS PRN


   PRN Reason: Constipation


Memantine (Namenda)  5 mg PO BID JAZ


   Stop: 18 16:59


   Last Admin: 10/19/18 17:48 Dose:  Not Given


Multivitamins/Vitamin C (Theragran)  1 tab PO DAILY JAZ


   Stop: 18 08:59


   Last Admin: 10/19/18 08:52 Dose:  Not Given


Olanzapine (Zyprexa)  5 mg PO HS JAZ; Protocol


   Stop: 18 20:59


   Last Admin: 10/19/18 20:23 Dose:  Not Given


Zolpidem Tartrate (Ambien)  5 mg PO HS PRN


   PRN Reason: Insomnia


   Stop: 12/10/18 21:42








General: demented


HEENT: NC/AT, PERRLA, EOMI, anicteric sclerae, throat clear


Neck: Supple, No JVD, No thyromegaly, +2 carotid pulse wo bruit, No LAD


Lungs: CTAB


Cardiovascular: RRR, Normal S1, Normal S2, without murmur


Abdomen: soft, non-tender, non-distended


Extremities: clear


Neurological: no change





Internal Medicine Assmt/Plan





- Assessment


Assessment: 





1.HTN.


2.COPD.


3.DJD.


4.DEMENTIA


5.PSYCHOSIS.





- Plan


Plan: 





CONTINUE ON CURRENT MEDICATION AND DIET.





Nutritional Asmnt/Malnutr-PDOC





- Dietary Evaluation


Malnutrition Findings (Please click <Entered> for more info): 








Nutritional Asmnt/Malnutrition                             Start:  10/12/18 14:

37


Text:                                                      Status: Complete    

  


Freq:                                                                          

  


Protocol:                                                                      

  


 Document     10/12/18 14:38  BISMARK  (Rec: 10/12/18 14:49  BISMARK  [email or web address])


 Nutritional Asmnt/Malnutrition


     Patient General Information


      Nutritional Screening                      High Risk


      Diagnosis                                  psychosis


      Pertinent Medical Hx/Surgical Hx           No H&P available as of now.


                                                 Per nursing note, pt has


                                                 history of bipolar disorder,


                                                 ETOH abuse, smoker, HTN


      Subjective Information                     Pt standing in hallway at time


                                                 of visit. Pt was alert and


                                                 oriented; states she dislikes


                                                 carbonated or diet beverages.


                                                 No PO intake noted at this


                                                 time; pt states she only ate a


                                                 small amount of breakfast


                                                 today. BMI 15.4 noted.


      Current Diet Order/ Nutrition Support      regular


      Pertinent Medications                      maalox, MOM, theragran


      Pertinent Labs                             no current lab results


     Nutritional Hx/Data


      Height                                     1.55 m


      Height (Calculated Centimeters)            154.9


      Current Weight (lbs)                       36.922 kg


      Weight (Calculated Kilograms)              36.9


      Weight (Calculated Grams)                  75703.4


      Ideal Body Weight                          105 lb


      Body Mass Index (BMI)                      15.3


      Weight Status                              Underweight


     GI Symptoms


      GI Symptoms                                None


      Last BM                                    none noted


      Difficult in:                              None


      Food Allergies                             No


      Skin Integrity/Comment:                    kehinde, agnes 20


     Estimated Nutritional Goals


      BEE in Kcals:                              Using Current wt


      Calories/Kcals/Kg                          30-35


      Kcals Calculated                           5134-0000


      Protein:                                   Using Current wt


      Protein g/k.2-1.4


      Protein Calculated                         44-52g


      Fluid: ml                                  8963-9556 (1 ml/kcal)


     Nutritional Problem


      1. Problem


       Problem                                   Underweight


       Etiology                                  possible poor PO intake


       Signs/Symptoms:                           BMI 15.4


     Intervention/Recommendation


      Comments                                   1. Continue with regular diet


                                                 as ordered.


                                                 2. If PO intake <75%, consider


                                                 adding nutrition supplement


                                                 for added kcals


                                                 2. Monitor PO intake, wt, labs


                                                 and skin integrity


                                                 3. F/U as moderate risk in 3-5


                                                 days, 10/15-10/17; PO check


                                                 10/15


     Expected Outcomes/Goals


      Expected Outcomes/Goals                    1. PO intake to meet at least


                                                 75% of nutritional needs


                                                 2. Wt gain or stability, skin


                                                 to remain intact, and


                                                 nutrition related labs to


                                                 approach normal limits


                                                 Reiewed by Mariluz Gomez RD

## 2018-10-19 NOTE — PROGRESS NOTES
DATE:  10/19/2018



PSYCHIATRIC PROGRESS NOTE



SUBJECTIVE:  Staff was spoken to.  The patient is interviewed.  Mood is noted to

be irritable.  Affect is constricted.  Coping skills at this time are noted to

be improving.  No side effects are noted, but the patient has been very

reluctant to comply with the medication.  The patient has no place to return to,

but still the patient is insisting that she has a place and she needs to go

there.  The  has been trying to work with the [employer] worker and the

family to see if the patient can be relocated to a facility close to the family

members.





DD: 10/19/2018 10:15

DT: 10/19/2018 21:00

JOB# [id number]  [id number]

## 2018-10-20 NOTE — INTERNAL MEDICINE PROG NOTE
Internal Medicine Subjective





- Subjective


Service Date: 10/20/18


Patient seen and examined:: with staff


Patient is:: awake, verbal, in bed, talking


Per staff patient has:: no adverse event





Internal Medicine Objective





- Physical Exam


Vitals and I&O: 


 Vital Signs











Temp  97.9 F   10/20/18 20:56


 


Pulse  82   10/20/18 20:56


 


Resp  18   10/20/18 20:56


 


BP  143/86   10/20/18 20:56


 


Pulse Ox  96   10/20/18 20:56








 Intake & Output











 10/20/18 10/20/18 10/21/18





 06:59 18:59 06:59


 


Intake Total  900 


 


Balance  900 


 


Intake:   


 


  Oral  900 


 


Other:   


 


  # Voids  3 


 


  # Bowel Movements  1 


 


  Stool Characteristics Formed Formed 





  Brown 











Active Medications: 


Current Medications





Acetaminophen (Tylenol)  650 mg PO Q4HR PRN


   PRN Reason: Mild Pain / Temp above 100


   Stop: 12/10/18 21:42


Al Hydrox/Mg Hydrox/Simethicone (Maalox)  30 ml PO Q4HR PRN


   PRN Reason: GI DISTRESS


   Stop: 12/10/18 21:42


Donepezil HCl (Aricept)  5 mg PO DAILY JAZ


   Stop: 18 08:59


   Last Admin: 10/20/18 08:52 Dose:  Not Given


Lisinopril (Zestril)  10 mg PO BID JAZ


   Stop: 18 09:59


   Last Admin: 10/20/18 16:15 Dose:  Not Given


Lorazepam (Ativan)  0.5 mg PO Q4H PRN; Protocol


   PRN Reason: Anxiety


   Stop: 12/10/18 21:42


Magnesium Hydroxide (Milk Of Magnesia)  30 ml PO HS PRN


   PRN Reason: Constipation


Memantine (Namenda)  5 mg PO BID JAZ


   Stop: 18 16:59


   Last Admin: 10/20/18 16:16 Dose:  Not Given


Multivitamins/Vitamin C (Theragran)  1 tab PO DAILY JAZ


   Stop: 18 08:59


   Last Admin: 10/20/18 08:52 Dose:  Not Given


Olanzapine (Zyprexa)  5 mg PO HS JAZ; Protocol


   Stop: 18 20:59


   Last Admin: 10/20/18 20:56 Dose:  Not Given


Zolpidem Tartrate (Ambien)  5 mg PO HS PRN


   PRN Reason: Insomnia


   Stop: 12/10/18 21:42








General: demented


HEENT: NC/AT, PERRLA, EOMI, anicteric sclerae, throat clear


Neck: Supple, No JVD, No thyromegaly, +2 carotid pulse wo bruit, No LAD


Lungs: CTAB


Cardiovascular: RRR, Normal S1, Normal S2, without murmur


Abdomen: soft, non-tender, non-distended


Extremities: clear


Neurological: no change





Internal Medicine Assmt/Plan





- Assessment


Assessment: 





1.HTN.


2.COPD.


3.DJD.


4.DEMENTIA


5.PSYCHOSIS.





- Plan


Plan: 





CONTINUE ON CURRENT MEDICATION AND DIET.





Nutritional Asmnt/Malnutr-PDOC





- Dietary Evaluation


Malnutrition Findings (Please click <Entered> for more info): 








Nutritional Asmnt/Malnutrition                             Start:  10/12/18 14:

37


Text:                                                      Status: Complete    

  


Freq:                                                                          

  


Protocol:                                                                      

  


 Document     10/12/18 14:38  BISMARK  (Rec: 10/12/18 14:49  [email or web address]  [email or web address])


 Nutritional Asmnt/Malnutrition


     Patient General Information


      Nutritional Screening                      High Risk


      Diagnosis                                  psychosis


      Pertinent Medical Hx/Surgical Hx           No H&P available as of now.


                                                 Per nursing note, pt has


                                                 history of bipolar disorder,


                                                 ETOH abuse, smoker, HTN


      Subjective Information                     Pt standing in hallway at time


                                                 of visit. Pt was alert and


                                                 oriented; states she dislikes


                                                 carbonated or diet beverages.


                                                 No PO intake noted at this


                                                 time; pt states she only ate a


                                                 small amount of breakfast


                                                 today. BMI 15.4 noted.


      Current Diet Order/ Nutrition Support      regular


      Pertinent Medications                      maalox, MOM, theragran


      Pertinent Labs                             no current lab results


     Nutritional Hx/Data


      Height                                     1.55 m


      Height (Calculated Centimeters)            154.9


      Current Weight (lbs)                       36.922 kg


      Weight (Calculated Kilograms)              36.9


      Weight (Calculated Grams)                  29354.4


      Ideal Body Weight                          105 lb


      Body Mass Index (BMI)                      15.3


      Weight Status                              Underweight


     GI Symptoms


      GI Symptoms                                None


      Last BM                                    none noted


      Difficult in:                              None


      Food Allergies                             No


      Skin Integrity/Comment:                    agnes busby 20


     Estimated Nutritional Goals


      BEE in Kcals:                              Using Current wt


      Calories/Kcals/Kg                          30-35


      Kcals Calculated                           6071-7325


      Protein:                                   Using Current wt


      Protein g/k.2-1.4


      Protein Calculated                         44-52g


      Fluid: ml                                  6844-4530 (1 ml/kcal)


     Nutritional Problem


      1. Problem


       Problem                                   Underweight


       Etiology                                  possible poor PO intake


       Signs/Symptoms:                           BMI 15.4


     Intervention/Recommendation


      Comments                                   1. Continue with regular diet


                                                 as ordered.


                                                 2. If PO intake <75%, consider


                                                 adding nutrition supplement


                                                 for added kcals


                                                 2. Monitor PO intake, wt, labs


                                                 and skin integrity


                                                 3. F/U as moderate risk in 3-5


                                                 days, 10/15-10/17; PO check


                                                 10/15


     Expected Outcomes/Goals


      Expected Outcomes/Goals                    1. PO intake to meet at least


                                                 75% of nutritional needs


                                                 2. Wt gain or stability, skin


                                                 to remain intact, and


                                                 nutrition related labs to


                                                 approach normal limits


                                                 Reiewed by Mariluz Gomez RD

## 2018-10-21 NOTE — PROGRESS NOTES
DATE:  10/21/2018



SUBJECTIVE:  Staff was spoken to.  The patient is interviewed.  Mood is noted to

be irritable.  Affect is constricted.  Insight and judgment are noted to be

still impaired.  Impulse control is noted to be poor.  Coping skills are also

noted to be very poor.  The patient has been having difficult time to cope with

the stress.



ASSESSMENT:  The patient is still demented and is awaiting placement.



PLAN:  To continue the patient with the supportive therapy and followup.





DD: 10/21/2018 11:48

DT: 10/21/2018 21:37

JOB# [id number]  [id number]

## 2018-10-21 NOTE — INTERNAL MEDICINE PROG NOTE
Internal Medicine Subjective





- Subjective


Service Date: 10/21/18


Patient seen and examined:: with staff


Patient is:: awake, verbal, in bed, talking


Per staff patient has:: no adverse event





Internal Medicine Objective





- Physical Exam


Vitals and I&O: 


 Vital Signs











Temp  97.6 F   10/21/18 16:09


 


Pulse  72   10/21/18 16:46


 


Resp  20   10/21/18 16:09


 


BP  117/70   10/21/18 16:46


 


Pulse Ox  96   10/21/18 16:09








 Intake & Output











 10/21/18 10/21/18 10/22/18





 06:59 18:59 06:59


 


Intake Total  1400 


 


Balance  1400 


 


Intake:   


 


  Oral  1400 


 


Other:   


 


  # Voids  4 


 


  # Bowel Movements  1 


 


  Stool Characteristics  Formed 





  Brown 











Active Medications: 


Current Medications





Acetaminophen (Tylenol)  650 mg PO Q4HR PRN


   PRN Reason: Mild Pain / Temp above 100


   Stop: 12/10/18 21:42


Al Hydrox/Mg Hydrox/Simethicone (Maalox)  30 ml PO Q4HR PRN


   PRN Reason: GI DISTRESS


   Stop: 12/10/18 21:42


Donepezil HCl (Aricept)  5 mg PO DAILY JAZ


   Stop: 18 08:59


   Last Admin: 10/21/18 08:10 Dose:  Not Given


Lisinopril (Zestril)  10 mg PO BID JAZ


   Stop: 18 09:59


   Last Admin: 10/21/18 16:46 Dose:  Not Given


Lorazepam (Ativan)  0.5 mg PO Q4H PRN; Protocol


   PRN Reason: Anxiety


   Stop: 12/10/18 21:42


Magnesium Hydroxide (Milk Of Magnesia)  30 ml PO HS PRN


   PRN Reason: Constipation


Memantine (Namenda)  5 mg PO BID JAZ


   Stop: 18 16:59


   Last Admin: 10/21/18 16:46 Dose:  Not Given


Multivitamins/Vitamin C (Theragran)  1 tab PO DAILY JAZ


   Stop: 18 08:59


   Last Admin: 10/21/18 08:10 Dose:  Not Given


Olanzapine (Zyprexa)  5 mg PO HS JAZ; Protocol


   Stop: 18 20:59


   Last Admin: 10/20/18 20:56 Dose:  Not Given


Zolpidem Tartrate (Ambien)  5 mg PO HS PRN


   PRN Reason: Insomnia


   Stop: 12/10/18 21:42








General: demented


HEENT: NC/AT, PERRLA, EOMI, anicteric sclerae, throat clear


Neck: Supple, No JVD, No thyromegaly, +2 carotid pulse wo bruit, No LAD


Lungs: CTAB


Cardiovascular: RRR, Normal S1, Normal S2, without murmur


Abdomen: soft, non-tender, non-distended


Extremities: clear


Neurological: no change





Internal Medicine Assmt/Plan





- Assessment


Assessment: 





1.HTN.


2.COPD.


3.DJD.


4.DEMENTIA


5.PSYCHOSIS.





- Plan


Plan: 





CONTINUE ON CURRENT MEDICATION AND DIET.





Nutritional Asmnt/Malnutr-PDOC





- Dietary Evaluation


Malnutrition Findings (Please click <Entered> for more info): 








Nutritional Asmnt/Malnutrition                             Start:  10/12/18 14:

37


Text:                                                      Status: Complete    

  


Freq:                                                                          

  


Protocol:                                                                      

  


 Document     10/12/18 14:38  BISMARK  (Rec: 10/12/18 14:49  [email or web address]  [email or web address])


 Nutritional Asmnt/Malnutrition


     Patient General Information


      Nutritional Screening                      High Risk


      Diagnosis                                  psychosis


      Pertinent Medical Hx/Surgical Hx           No H&P available as of now.


                                                 Per nursing note, pt has


                                                 history of bipolar disorder,


                                                 ETOH abuse, smoker, HTN


      Subjective Information                     Pt standing in hallway at time


                                                 of visit. Pt was alert and


                                                 oriented; states she dislikes


                                                 carbonated or diet beverages.


                                                 No PO intake noted at this


                                                 time; pt states she only ate a


                                                 small amount of breakfast


                                                 today. BMI 15.4 noted.


      Current Diet Order/ Nutrition Support      regular


      Pertinent Medications                      maalox, MOM, theragran


      Pertinent Labs                             no current lab results


     Nutritional Hx/Data


      Height                                     1.55 m


      Height (Calculated Centimeters)            154.9


      Current Weight (lbs)                       36.922 kg


      Weight (Calculated Kilograms)              36.9


      Weight (Calculated Grams)                  70565.4


      Ideal Body Weight                          105 lb


      Body Mass Index (BMI)                      15.3


      Weight Status                              Underweight


     GI Symptoms


      GI Symptoms                                None


      Last BM                                    none noted


      Difficult in:                              None


      Food Allergies                             No


      Skin Integrity/Comment:                    agnes busby 20


     Estimated Nutritional Goals


      BEE in Kcals:                              Using Current wt


      Calories/Kcals/Kg                          30-35


      Kcals Calculated                           6536-1118


      Protein:                                   Using Current wt


      Protein g/k.2-1.4


      Protein Calculated                         44-52g


      Fluid: ml                                  5019-2986 (1 ml/kcal)


     Nutritional Problem


      1. Problem


       Problem                                   Underweight


       Etiology                                  possible poor PO intake


       Signs/Symptoms:                           BMI 15.4


     Intervention/Recommendation


      Comments                                   1. Continue with regular diet


                                                 as ordered.


                                                 2. If PO intake <75%, consider


                                                 adding nutrition supplement


                                                 for added kcals


                                                 2. Monitor PO intake, wt, labs


                                                 and skin integrity


                                                 3. F/U as moderate risk in 3-5


                                                 days, 10/15-10/17; PO check


                                                 10/15


     Expected Outcomes/Goals


      Expected Outcomes/Goals                    1. PO intake to meet at least


                                                 75% of nutritional needs


                                                 2. Wt gain or stability, skin


                                                 to remain intact, and


                                                 nutrition related labs to


                                                 approach normal limits


                                                 Reiewed by Mariluz Gomez RD

## 2018-10-21 NOTE — PROGRESS NOTES
DATE:  10/20/2018



SUBJECTIVE:  Staff was spoken to.  The patient is interviewed.  Mood is noted to

be irritable.  Affect is constricted.  The patient is stating that there is no

reason for her to be in here.  The patient is stating that she needs to be out,

she has her own place.  The patient has been demented and has been having

difficult time to cope with the stress.  The patient has no place to return to.



ASSESSMENT:  The patient is still paranoid.



PLAN:  To continue the patient with the supportive therapy, encouraged the

patient to verbalize the concerns rather than to act out.





DD: 10/20/2018 14:07

DT: 10/21/2018 05:15

JOB# [id number]  [id number]

## 2018-10-22 NOTE — PROGRESS NOTES
DATE:  10/22/2018



SUBJECTIVE:  The staff was spoken to.  The patient is interviewed.  Mood is

noted to be anxious.  Affect is appropriate.  Not suicidal or homicidal. 

Insight and judgment are improving.  Impulse control seems to be fair.  The

patient has short-term memory deficits, but long-term memory seems to be fair. 

The patient denied hallucinations.  The patient is willing to comply with the

treatment.



ASSESSMENT:  The patient is stabilizing.



PLAN:  To discharge the patient today for followup on outpatient basis.





DD: 10/22/2018 11:00

DT: 10/22/2018 17:45

JOB# [id number]  [id number]